# Patient Record
Sex: FEMALE | Race: BLACK OR AFRICAN AMERICAN | NOT HISPANIC OR LATINO | Employment: PART TIME | URBAN - METROPOLITAN AREA
[De-identification: names, ages, dates, MRNs, and addresses within clinical notes are randomized per-mention and may not be internally consistent; named-entity substitution may affect disease eponyms.]

---

## 2024-04-04 ENCOUNTER — HOSPITAL ENCOUNTER (OUTPATIENT)
Dept: PREADMISSION TESTING | Facility: HOSPITAL | Age: 59
Discharge: HOME | End: 2024-04-04
Attending: SPECIALIST
Payer: MEDICAID

## 2024-04-04 ENCOUNTER — HOSPITAL ENCOUNTER (OUTPATIENT)
Dept: RADIOLOGY | Facility: HOSPITAL | Age: 59
Discharge: HOME | End: 2024-04-04
Payer: MEDICAID

## 2024-04-04 VITALS
DIASTOLIC BLOOD PRESSURE: 78 MMHG | SYSTOLIC BLOOD PRESSURE: 148 MMHG | TEMPERATURE: 98 F | WEIGHT: 251.31 LBS | OXYGEN SATURATION: 98 % | HEIGHT: 68 IN | BODY MASS INDEX: 38.09 KG/M2 | RESPIRATION RATE: 18 BRPM | HEART RATE: 52 BPM

## 2024-04-04 DIAGNOSIS — Z01.818 PRE-OP TESTING: ICD-10-CM

## 2024-04-04 DIAGNOSIS — Z01.818 PRE-OP TESTING: Primary | ICD-10-CM

## 2024-04-04 PROCEDURE — 93005 ELECTROCARDIOGRAM TRACING: CPT | Performed by: GENERAL PRACTICE

## 2024-04-04 PROCEDURE — 93010 ELECTROCARDIOGRAM REPORT: CPT | Mod: ,,, | Performed by: GENERAL PRACTICE

## 2024-04-04 PROCEDURE — 71046 X-RAY EXAM CHEST 2 VIEWS: CPT | Mod: TC

## 2024-04-04 RX ORDER — ROSUVASTATIN CALCIUM 20 MG/1
1 TABLET, COATED ORAL DAILY
COMMUNITY

## 2024-04-04 RX ORDER — GABAPENTIN 300 MG/1
600 CAPSULE ORAL NIGHTLY PRN
COMMUNITY
Start: 2024-01-06

## 2024-04-04 RX ORDER — DICLOFENAC SODIUM 10 MG/G
2 GEL TOPICAL DAILY
COMMUNITY

## 2024-04-04 RX ORDER — LISINOPRIL AND HYDROCHLOROTHIAZIDE 20; 25 MG/1; MG/1
1 TABLET ORAL 2 TIMES DAILY
COMMUNITY

## 2024-04-04 RX ORDER — ASPIRIN 81 MG/1
81 TABLET ORAL EVERY 6 HOURS PRN
COMMUNITY

## 2024-04-04 RX ORDER — METFORMIN HYDROCHLORIDE 1000 MG/1
1 TABLET ORAL 2 TIMES DAILY
COMMUNITY
Start: 2024-01-03

## 2024-04-04 RX ORDER — AMLODIPINE BESYLATE 5 MG/1
1 TABLET ORAL 2 TIMES DAILY
COMMUNITY

## 2024-04-04 RX ORDER — ERGOCALCIFEROL 1.25 MG/1
1 CAPSULE ORAL
COMMUNITY
Start: 2023-11-09

## 2024-04-04 RX ORDER — LORATADINE 10 MG/1
1 TABLET ORAL DAILY
COMMUNITY

## 2024-04-04 NOTE — PRE-PROCEDURE INSTRUCTIONS
Preadmit assessment complete. Review of pt health history and home medications.  Preop education per AVS. Pt voiced understanding      Advised to bring all meds in original pill bottle

## 2024-04-04 NOTE — DISCHARGE INSTRUCTIONS
Your procedure is scheduled for:April 9          [facility] at:7:30     Nothing to eat or drink after midnight the night before your procedure.  Do not take any medications the morning of your procedure  Bring all your medications with you in the original pill bottles from pharmacy.  If you take blood thinners, ask your doctor if you should stop taking them.  Do not take metformin 24 hours prior to your procedure.  Adjust your insulin or other diabetes medications if needed.   Do your chlorhexidine wash the night before and morning of your procedure.    Make arrangements for someone you know to drive you home after your procedure. Taxi and Uber are not acceptable.         Any questions call the [facility] at [phone]

## 2024-04-09 ENCOUNTER — HOSPITAL ENCOUNTER (OUTPATIENT)
Facility: HOSPITAL | Age: 59
Discharge: HOME | End: 2024-04-09
Attending: SPECIALIST | Admitting: SPECIALIST
Payer: MEDICAID

## 2024-04-09 VITALS
HEART RATE: 57 BPM | SYSTOLIC BLOOD PRESSURE: 133 MMHG | RESPIRATION RATE: 19 BRPM | WEIGHT: 251.31 LBS | TEMPERATURE: 98 F | BODY MASS INDEX: 38.09 KG/M2 | DIASTOLIC BLOOD PRESSURE: 66 MMHG | OXYGEN SATURATION: 96 % | HEIGHT: 68 IN

## 2024-04-09 DIAGNOSIS — R94.39 ABNORMAL STRESS TEST: ICD-10-CM

## 2024-04-09 LAB — CATH EF QUANTITATIVE: 65 %

## 2024-04-09 PROCEDURE — C1769 GUIDE WIRE: HCPCS | Performed by: SPECIALIST

## 2024-04-09 PROCEDURE — 25000003 PHARM REV CODE 250: Performed by: SPECIALIST

## 2024-04-09 PROCEDURE — 93458 L HRT ARTERY/VENTRICLE ANGIO: CPT | Performed by: SPECIALIST

## 2024-04-09 PROCEDURE — 63600175 PHARM REV CODE 636 W HCPCS: Performed by: SPECIALIST

## 2024-04-09 PROCEDURE — 99152 MOD SED SAME PHYS/QHP 5/>YRS: CPT | Performed by: SPECIALIST

## 2024-04-09 PROCEDURE — C1894 INTRO/SHEATH, NON-LASER: HCPCS | Performed by: SPECIALIST

## 2024-04-09 PROCEDURE — C1887 CATHETER, GUIDING: HCPCS | Performed by: SPECIALIST

## 2024-04-09 PROCEDURE — 25500020 PHARM REV CODE 255: Performed by: SPECIALIST

## 2024-04-09 RX ORDER — FENTANYL CITRATE 50 UG/ML
INJECTION, SOLUTION INTRAMUSCULAR; INTRAVENOUS
Status: DISCONTINUED | OUTPATIENT
Start: 2024-04-09 | End: 2024-04-09 | Stop reason: HOSPADM

## 2024-04-09 RX ORDER — POTASSIUM CHLORIDE 20 MEQ/1
40 TABLET, EXTENDED RELEASE ORAL
Status: DISCONTINUED | OUTPATIENT
Start: 2024-04-09 | End: 2024-04-09 | Stop reason: HOSPADM

## 2024-04-09 RX ORDER — MIDAZOLAM HYDROCHLORIDE 1 MG/ML
INJECTION INTRAMUSCULAR; INTRAVENOUS
Status: DISCONTINUED | OUTPATIENT
Start: 2024-04-09 | End: 2024-04-09 | Stop reason: HOSPADM

## 2024-04-09 RX ORDER — SODIUM CHLORIDE 9 MG/ML
INJECTION, SOLUTION INTRAVENOUS CONTINUOUS
Status: ACTIVE | OUTPATIENT
Start: 2024-04-09 | End: 2024-04-09

## 2024-04-09 RX ORDER — SODIUM CHLORIDE 9 MG/ML
INJECTION, SOLUTION INTRAVENOUS ONCE
Status: COMPLETED | OUTPATIENT
Start: 2024-04-09 | End: 2024-04-09

## 2024-04-09 RX ORDER — POTASSIUM CHLORIDE 20 MEQ/1
TABLET, EXTENDED RELEASE ORAL
Status: DISCONTINUED
Start: 2024-04-09 | End: 2024-04-09 | Stop reason: HOSPADM

## 2024-04-09 RX ORDER — ACETAMINOPHEN 325 MG/1
650 TABLET ORAL EVERY 4 HOURS PRN
Status: DISCONTINUED | OUTPATIENT
Start: 2024-04-09 | End: 2024-04-09 | Stop reason: HOSPADM

## 2024-04-09 RX ORDER — ONDANSETRON 4 MG/1
8 TABLET, ORALLY DISINTEGRATING ORAL EVERY 8 HOURS PRN
Status: DISCONTINUED | OUTPATIENT
Start: 2024-04-09 | End: 2024-04-09 | Stop reason: HOSPADM

## 2024-04-09 RX ORDER — LIDOCAINE HYDROCHLORIDE 10 MG/ML
INJECTION, SOLUTION EPIDURAL; INFILTRATION; INTRACAUDAL; PERINEURAL
Status: DISCONTINUED | OUTPATIENT
Start: 2024-04-09 | End: 2024-04-09 | Stop reason: HOSPADM

## 2024-04-09 RX ADMIN — SODIUM CHLORIDE: 9 INJECTION, SOLUTION INTRAVENOUS at 07:04

## 2024-04-09 RX ADMIN — POTASSIUM CHLORIDE 40 MEQ: 1500 TABLET, EXTENDED RELEASE ORAL at 07:04

## 2024-04-09 NOTE — DISCHARGE INSTRUCTIONS
Post Radial Cath Discharge Instructions  Keep puncture site covered with current bandage for 24 hours.  You may shower in 24 hours. Do not take a tub bath or submerge the puncture site in water for 72 hours.   You may cover the site with a Band-Aid. Keep Band-Aid clean and dry at all times.    No lifting over 5 pounds with the arm you puncture site is on for 72 hours.  No strenuous activity such as bowling, tennis, etc for 72 hours  Do not operate lawnmower, motorcycle, chainsaw, or all terrain vehicle for 72 hours.  No blood pressure or tourniquets on affected arm for 72 hours.   The puncture site may be slightly bruised and sore following your procedure.   Drink 6-8 glasses of clear liquids during the next 24 hours to flush the dye out.     If Bleeding Occurs, DO NOT PANIC  Place 1 or 2 fingers over the puncture site and hold firm pressure to stop bleeding. You may be able to feel your pulse as you hold pressure  Lift you fingers after 5 minutes to see if the bleeding stopped.  Once has stopped, gently wipe the wrist area clean and cover with a bandage.  If the bleeding does not stop after 30 minutes, or if there is a large amount of bleeding or spurting, call 911! Do not drive yourself to the hospital.     Should any of the following occur, Contact your Physician Immediately:  Redness, inflammation, swelling, chills or fever, or discolored drainage at procedure site   Coldness, discoloration, ongoing numbness, severe pain, or swelling. Expect mild tingling of hand and tenderness at the puncture site for up to 3 days. If this persists or other symptoms develop, notify your physician

## 2024-04-09 NOTE — Clinical Note
The catheter was inserted into the and was inserted over the wire into the left ventricle. Hemodynamics were performed.  and Pullback was recorded.  An angiography was performed of the LV GRAM. The angiography was performed via power injection. The injected amount was 12 mL contrast at 12 mL/s. The PSI from the power injection was 400.

## 2024-04-09 NOTE — PLAN OF CARE
Verbalizes understanding of discharge instructions, angio site care, and follow up care. Belongings gathered and dressed in personal clothing. Left via wheelchair to private auto accompanied by spouse.

## 2024-04-09 NOTE — Clinical Note
The catheter was repositioned into the ostium   left main. An angiography was performed of the left coronary arteries. Multiple views were taken. The angiography was performed via power injection. The injected amount was 8 mL contrast at 4 mL/s. The PSI from the power injection was 400.

## 2024-04-09 NOTE — PLAN OF CARE
Ambulated around unit and to restroom without difficulty. Returned to room with no complaints of pain or distress noted. VSS. Dressing to right radial remains CDI. Proceed with discharge as ordered.

## 2024-04-09 NOTE — PLAN OF CARE
Bedside hand off report given to HA Galvan .  Chart given. Left to cath lab via stretcher for scheduled procedure. Spouse returned to waiting area.

## 2024-04-09 NOTE — DISCHARGE SUMMARY
[facility]  Discharge Note  Short Stay    Procedure(s) (LRB):  Left heart cath (Left)      OUTCOME: Patient tolerated treatment/procedure well without complication and is now ready for discharge.    DISPOSITION: Home or Self Care    FINAL DIAGNOSIS:  <principal problem not specified> normal coronary arteries with evidence of diastolic dysfunction and normal systolic function  FOLLOWUP: In clinic 1-2 weeks    DISCHARGE INSTRUCTIONS:  No discharge procedures on file.  Patient is been advised if there are any problems call the office or go to the emergency room 24/7    TIME SPENT ON DISCHARGE: 3 minutes

## 2024-04-09 NOTE — PLAN OF CARE
Report received from HA Galvan. Arrived back from cath lab via stretcher. No complaints of pain or distress noted. TR band to right radial CDI with 12ml of pressure per cath lab report. Post angio instructions given. Resting in bed with call light in reach.

## 2024-04-09 NOTE — Clinical Note
Radial flush administered by Dr. Mcconnell.   Radial Flush: 5 mg of Verapamil, 400 mcg of Nitro, & 5000 units of Heparin mixed in 10 cc normal saline flush.

## 2024-04-24 LAB
OHS QRS DURATION: 122 MS
OHS QTC CALCULATION: 377 MS

## (undated) DEVICE — CATH DXTERITY JR40 100CM 5FR

## (undated) DEVICE — KIT GLIDESHEATH SLEND 6FR 10CM

## (undated) DEVICE — CATH DXTERITY JL35 100CM 5FR

## (undated) DEVICE — CATH OPTITORQUE RADIAL 5FR

## (undated) DEVICE — HEMOSTAT VASC BAND LONG 27CM

## (undated) DEVICE — GUIDEWIRE INQWIRE SE 3MM JTIP